# Patient Record
Sex: FEMALE | Race: WHITE
[De-identification: names, ages, dates, MRNs, and addresses within clinical notes are randomized per-mention and may not be internally consistent; named-entity substitution may affect disease eponyms.]

---

## 2018-07-30 ENCOUNTER — HOSPITAL ENCOUNTER (OUTPATIENT)
Dept: HOSPITAL 74 - FASU-ENDO | Age: 63
Discharge: HOME | End: 2018-07-30
Attending: INTERNAL MEDICINE
Payer: COMMERCIAL

## 2018-07-30 VITALS — SYSTOLIC BLOOD PRESSURE: 137 MMHG | DIASTOLIC BLOOD PRESSURE: 69 MMHG

## 2018-07-30 VITALS — TEMPERATURE: 97.6 F | HEART RATE: 70 BPM

## 2018-07-30 VITALS — BODY MASS INDEX: 23.3 KG/M2

## 2018-07-30 DIAGNOSIS — Z87.19: Primary | ICD-10-CM

## 2018-07-30 PROCEDURE — 0DBH8ZX EXCISION OF CECUM, VIA NATURAL OR ARTIFICIAL OPENING ENDOSCOPIC, DIAGNOSTIC: ICD-10-PCS | Performed by: INTERNAL MEDICINE

## 2018-07-30 PROCEDURE — 0DBM8ZX EXCISION OF DESCENDING COLON, VIA NATURAL OR ARTIFICIAL OPENING ENDOSCOPIC, DIAGNOSTIC: ICD-10-PCS | Performed by: INTERNAL MEDICINE

## 2018-07-30 PROCEDURE — 0DBK8ZX EXCISION OF ASCENDING COLON, VIA NATURAL OR ARTIFICIAL OPENING ENDOSCOPIC, DIAGNOSTIC: ICD-10-PCS | Performed by: INTERNAL MEDICINE

## 2018-07-30 PROCEDURE — 0DBL8ZX EXCISION OF TRANSVERSE COLON, VIA NATURAL OR ARTIFICIAL OPENING ENDOSCOPIC, DIAGNOSTIC: ICD-10-PCS | Performed by: INTERNAL MEDICINE

## 2018-07-30 PROCEDURE — 0DBN8ZX EXCISION OF SIGMOID COLON, VIA NATURAL OR ARTIFICIAL OPENING ENDOSCOPIC, DIAGNOSTIC: ICD-10-PCS | Performed by: INTERNAL MEDICINE

## 2018-07-30 PROCEDURE — 0DBP8ZX EXCISION OF RECTUM, VIA NATURAL OR ARTIFICIAL OPENING ENDOSCOPIC, DIAGNOSTIC: ICD-10-PCS | Performed by: INTERNAL MEDICINE

## 2018-07-31 NOTE — PATH
Surgical Pathology Report



Patient Name:  REMA YORK

Accession #:  [id number]

Med. Rec. #:  [id number]                                                        

   /Age/Gender:  1955 (Age: 62) / F

Account:  [id number]                                                          

             Location: 

Taken:  2018

Received:  2018

Reported:  2018

Physicians:  Denver Zavala M.D.

  



Specimen(s) Received

A: BX CECUM 

B: RIGHT COLON 

C: TRANSVERSE 

D: LEFT COLON 

E: BX SIGMOID 

F: RECTO SIGMOID 

G: RECTUM 





Clinical History

Ulcerative colitis, rule out dysplasia 







Final Diagnosis

A. CECUM, BIOPSY:

COLONIC MUCOSA WITH NO SIGNIFICANT PATHOLOGIC FINDINGS.

NEGATIVE FOR COLITIS AND DYSPLASIA.



B. RIGHT COLON, BIOPSY:

COLONIC MUCOSA WITH NO SIGNIFICANT PATHOLOGIC FINDINGS.

NEGATIVE FOR COLITIS AND DYSPLASIA.



C. TRANSVERSE COLON, BIOPSY:

COLONIC MUCOSA WITH NO SIGNIFICANT PATHOLOGIC FINDINGS.

NEGATIVE FOR COLITIS AND DYSPLASIA.



D. LEFT COLON, BIOPSY:

COLONIC MUCOSA WITH NO SIGNIFICANT PATHOLOGIC FINDINGS.

NEGATIVE FOR COLITIS AND DYSPLASIA.



E. SIGMOID, BIOPSY:

COLONIC MUCOSA WITH NO SIGNIFICANT PATHOLOGIC FINDINGS.

NEGATIVE FOR COLITIS AND DYSPLASIA.



F. RECTOSIGMOID, BIOPSY:

COLONIC MUCOSA WITH CRYPTAL ARCHITECTURAL DISTORTION AND LYMPHOID AGGREGATES.

NEGATIVE FOR COLITIS AND DYSPLASIA.



G. RECTUM, BIOPSY:

COLONIC MUCOSA WITH CRYPTAL ARCHITECTURAL DISTORTION AND LYMPHOID AGGREGATES.

NEGATIVE FOR COLITIS AND DYSPLASIA.







***Electronically Signed***

Jaqueline Verma M.D.





Gross Description

A.  Received in formalin, labeled "cecum" are 3 tan, irregular portions of soft

tissue measuring 0.4 cm. in greatest dimension. The specimens are submitted in

toto in one cassette.



B.  Received in formalin, labeled "right colon" are multiple tan, irregular

portions of soft tissue measuring 0.4 cm. in greatest dimension. The specimens

are submitted in toto in one cassette.



C.  Received in formalin, labeled "transverse colon" are 3 tan, irregular

portions of soft tissue measuring 0.3 cm. in greatest dimension. The specimens

are submitted in toto in one cassette.



D.  Received in formalin, labeled "left colon" are 3 tan, irregular portions of

soft tissue measuring 0.3 cm. in greatest dimension. The specimens are submitted

in toto in one cassette.



E.  Received in formalin, labeled "sigmoid" are multiple tan, irregular portion

of soft tissue measuring 0.3 cm. in greatest dimension. The specimens are

submitted in toto in one cassette.



F.  Received in formalin, labeled "rectosigmoid" is a tan, irregular portion of

soft tissue measuring 0.3 cm. in greatest dimension. The specimens are submitted

in toto in one cassette.



G.  Received in formalin, labeled "rectum" are multiple tan, irregular portions

of soft tissue measuring 0.3 cm. in greatest dimension. The specimens are

submitted in toto in one cassette.

GRAY/2018



[email or web address]

## 2021-07-19 ENCOUNTER — HOSPITAL ENCOUNTER (OUTPATIENT)
Dept: HOSPITAL 74 - FASU-ENDO | Age: 66
Discharge: HOME | End: 2021-07-19
Attending: INTERNAL MEDICINE
Payer: COMMERCIAL

## 2021-07-19 VITALS — BODY MASS INDEX: 24.2 KG/M2

## 2021-07-19 VITALS — TEMPERATURE: 97.5 F | HEART RATE: 66 BPM

## 2021-07-19 VITALS — SYSTOLIC BLOOD PRESSURE: 116 MMHG | DIASTOLIC BLOOD PRESSURE: 73 MMHG

## 2021-07-19 DIAGNOSIS — K52.89: Primary | ICD-10-CM

## 2021-07-19 DIAGNOSIS — Z87.19: ICD-10-CM

## 2021-07-19 PROCEDURE — 0DBP8ZX EXCISION OF RECTUM, VIA NATURAL OR ARTIFICIAL OPENING ENDOSCOPIC, DIAGNOSTIC: ICD-10-PCS | Performed by: INTERNAL MEDICINE

## 2021-07-19 PROCEDURE — 0DBM8ZX EXCISION OF DESCENDING COLON, VIA NATURAL OR ARTIFICIAL OPENING ENDOSCOPIC, DIAGNOSTIC: ICD-10-PCS | Performed by: INTERNAL MEDICINE

## 2021-07-19 PROCEDURE — 0DBH8ZX EXCISION OF CECUM, VIA NATURAL OR ARTIFICIAL OPENING ENDOSCOPIC, DIAGNOSTIC: ICD-10-PCS | Performed by: INTERNAL MEDICINE

## 2021-07-19 PROCEDURE — 0DBK8ZX EXCISION OF ASCENDING COLON, VIA NATURAL OR ARTIFICIAL OPENING ENDOSCOPIC, DIAGNOSTIC: ICD-10-PCS | Performed by: INTERNAL MEDICINE

## 2021-07-19 PROCEDURE — 0DBN8ZX EXCISION OF SIGMOID COLON, VIA NATURAL OR ARTIFICIAL OPENING ENDOSCOPIC, DIAGNOSTIC: ICD-10-PCS | Performed by: INTERNAL MEDICINE

## 2021-07-19 PROCEDURE — 0DBL8ZX EXCISION OF TRANSVERSE COLON, VIA NATURAL OR ARTIFICIAL OPENING ENDOSCOPIC, DIAGNOSTIC: ICD-10-PCS | Performed by: INTERNAL MEDICINE

## 2022-07-18 ENCOUNTER — HOSPITAL ENCOUNTER (OUTPATIENT)
Dept: HOSPITAL 74 - FASU-ENDO | Age: 67
Discharge: HOME | End: 2022-07-18
Attending: INTERNAL MEDICINE
Payer: COMMERCIAL

## 2022-07-18 VITALS — BODY MASS INDEX: 24.2 KG/M2

## 2022-07-18 VITALS — DIASTOLIC BLOOD PRESSURE: 81 MMHG | SYSTOLIC BLOOD PRESSURE: 122 MMHG | HEART RATE: 79 BPM

## 2022-07-18 VITALS — TEMPERATURE: 97.3 F

## 2022-07-18 DIAGNOSIS — K29.50: ICD-10-CM

## 2022-07-18 DIAGNOSIS — K21.00: Primary | ICD-10-CM

## 2022-07-18 DIAGNOSIS — K44.9: ICD-10-CM

## 2022-07-18 DIAGNOSIS — R12: ICD-10-CM

## 2022-07-18 PROCEDURE — 0DB48ZX EXCISION OF ESOPHAGOGASTRIC JUNCTION, VIA NATURAL OR ARTIFICIAL OPENING ENDOSCOPIC, DIAGNOSTIC: ICD-10-PCS | Performed by: INTERNAL MEDICINE

## 2022-07-18 PROCEDURE — 0DB98ZX EXCISION OF DUODENUM, VIA NATURAL OR ARTIFICIAL OPENING ENDOSCOPIC, DIAGNOSTIC: ICD-10-PCS | Performed by: INTERNAL MEDICINE

## 2022-07-18 PROCEDURE — 0DB68ZX EXCISION OF STOMACH, VIA NATURAL OR ARTIFICIAL OPENING ENDOSCOPIC, DIAGNOSTIC: ICD-10-PCS | Performed by: INTERNAL MEDICINE

## 2023-08-07 ENCOUNTER — HOSPITAL ENCOUNTER (OUTPATIENT)
Dept: HOSPITAL 74 - FASU-ENDO | Age: 68
Discharge: HOME | End: 2023-08-07
Attending: INTERNAL MEDICINE
Payer: COMMERCIAL

## 2023-08-07 VITALS — TEMPERATURE: 97.3 F

## 2023-08-07 VITALS — RESPIRATION RATE: 18 BRPM

## 2023-08-07 VITALS — BODY MASS INDEX: 23.3 KG/M2

## 2023-08-07 VITALS — SYSTOLIC BLOOD PRESSURE: 121 MMHG | HEART RATE: 69 BPM | DIASTOLIC BLOOD PRESSURE: 67 MMHG

## 2023-08-07 DIAGNOSIS — Z87.19: Primary | ICD-10-CM

## 2023-08-07 DIAGNOSIS — K63.89: ICD-10-CM

## 2023-08-07 PROCEDURE — 0DBL8ZX EXCISION OF TRANSVERSE COLON, VIA NATURAL OR ARTIFICIAL OPENING ENDOSCOPIC, DIAGNOSTIC: ICD-10-PCS | Performed by: INTERNAL MEDICINE

## 2023-08-07 PROCEDURE — 0DBP8ZX EXCISION OF RECTUM, VIA NATURAL OR ARTIFICIAL OPENING ENDOSCOPIC, DIAGNOSTIC: ICD-10-PCS | Performed by: INTERNAL MEDICINE

## 2023-08-07 PROCEDURE — 0DBK8ZX EXCISION OF ASCENDING COLON, VIA NATURAL OR ARTIFICIAL OPENING ENDOSCOPIC, DIAGNOSTIC: ICD-10-PCS | Performed by: INTERNAL MEDICINE

## 2023-08-07 PROCEDURE — 0DBN8ZX EXCISION OF SIGMOID COLON, VIA NATURAL OR ARTIFICIAL OPENING ENDOSCOPIC, DIAGNOSTIC: ICD-10-PCS | Performed by: INTERNAL MEDICINE

## 2023-08-07 PROCEDURE — 0DBH8ZX EXCISION OF CECUM, VIA NATURAL OR ARTIFICIAL OPENING ENDOSCOPIC, DIAGNOSTIC: ICD-10-PCS | Performed by: INTERNAL MEDICINE

## 2023-08-07 PROCEDURE — 0DBM8ZX EXCISION OF DESCENDING COLON, VIA NATURAL OR ARTIFICIAL OPENING ENDOSCOPIC, DIAGNOSTIC: ICD-10-PCS | Performed by: INTERNAL MEDICINE
